# Patient Record
Sex: FEMALE | Race: WHITE
[De-identification: names, ages, dates, MRNs, and addresses within clinical notes are randomized per-mention and may not be internally consistent; named-entity substitution may affect disease eponyms.]

---

## 2020-07-21 ENCOUNTER — HOSPITAL ENCOUNTER (EMERGENCY)
Dept: HOSPITAL 26 - MED | Age: 40
Discharge: HOME | End: 2020-07-21
Payer: COMMERCIAL

## 2020-07-21 VITALS — SYSTOLIC BLOOD PRESSURE: 127 MMHG | DIASTOLIC BLOOD PRESSURE: 81 MMHG

## 2020-07-21 VITALS — WEIGHT: 205 LBS | HEIGHT: 67 IN | BODY MASS INDEX: 32.18 KG/M2

## 2020-07-21 DIAGNOSIS — Z79.899: ICD-10-CM

## 2020-07-21 DIAGNOSIS — O20.8: Primary | ICD-10-CM

## 2020-07-21 DIAGNOSIS — Z3A.09: ICD-10-CM

## 2020-07-21 LAB
ALBUMIN FLD-MCNC: 3.5 G/DL (ref 3.4–5)
ANION GAP SERPL CALCULATED.3IONS-SCNC: 16 MMOL/L (ref 8–16)
APPEARANCE UR: CLEAR
AST SERPL-CCNC: 49 U/L (ref 15–37)
BASOPHILS # BLD AUTO: 0.1 K/UL (ref 0–0.22)
BASOPHILS NFR BLD AUTO: 0.7 % (ref 0–2)
BILIRUB SERPL-MCNC: 0.4 MG/DL (ref 0–1)
BILIRUB UR QL STRIP: NEGATIVE
BUN SERPL-MCNC: 6 MG/DL (ref 7–18)
CHLORIDE SERPL-SCNC: 104 MMOL/L (ref 98–107)
CO2 SERPL-SCNC: 23.6 MMOL/L (ref 21–32)
COLOR UR: YELLOW
CREAT SERPL-MCNC: 0.8 MG/DL (ref 0.6–1.3)
EOSINOPHIL # BLD AUTO: 0.2 K/UL (ref 0–0.4)
EOSINOPHIL NFR BLD AUTO: 2.1 % (ref 0–4)
ERYTHROCYTE [DISTWIDTH] IN BLOOD BY AUTOMATED COUNT: 16.4 % (ref 11.6–13.7)
GFR SERPL CREATININE-BSD FRML MDRD: 101 ML/MIN (ref 90–?)
GLUCOSE SERPL-MCNC: 97 MG/DL (ref 74–106)
GLUCOSE UR STRIP-MCNC: NEGATIVE MG/DL
HCT VFR BLD AUTO: 37.9 % (ref 36–48)
HGB BLD-MCNC: 12.2 G/DL (ref 12–16)
HGB UR QL STRIP: (no result)
LEUKOCYTE ESTERASE UR QL STRIP: NEGATIVE
LYMPHOCYTES # BLD AUTO: 4.2 K/UL (ref 2.5–16.5)
LYMPHOCYTES NFR BLD AUTO: 37.5 % (ref 20.5–51.1)
MCH RBC QN AUTO: 24 PG (ref 27–31)
MCHC RBC AUTO-ENTMCNC: 32 G/DL (ref 33–37)
MCV RBC AUTO: 74.8 FL (ref 80–94)
MONOCYTES # BLD AUTO: 0.6 K/UL (ref 0.8–1)
MONOCYTES NFR BLD AUTO: 5.2 % (ref 1.7–9.3)
NEUTROPHILS # BLD AUTO: 6.1 K/UL (ref 1.8–7.7)
NEUTROPHILS NFR BLD AUTO: 54.5 % (ref 42.2–75.2)
NITRITE UR QL STRIP: NEGATIVE
PH UR STRIP: 6 [PH] (ref 5–9)
PLATELET # BLD AUTO: 237 K/UL (ref 140–450)
POTASSIUM SERPL-SCNC: 3.6 MMOL/L (ref 3.5–5.1)
RBC # BLD AUTO: 5.07 MIL/UL (ref 4.2–5.4)
RBC #/AREA URNS HPF: (no result) /HPF (ref 0–5)
SODIUM SERPL-SCNC: 140 MMOL/L (ref 136–145)
WBC # BLD AUTO: 11.2 K/UL (ref 4.8–10.8)
WBC,URINE: (no result) /HPF (ref 0–5)

## 2020-07-21 PROCEDURE — 85025 COMPLETE CBC W/AUTO DIFF WBC: CPT

## 2020-07-21 PROCEDURE — 80053 COMPREHEN METABOLIC PANEL: CPT

## 2020-07-21 PROCEDURE — 84702 CHORIONIC GONADOTROPIN TEST: CPT

## 2020-07-21 PROCEDURE — 99284 EMERGENCY DEPT VISIT MOD MDM: CPT

## 2020-07-21 PROCEDURE — 36415 COLL VENOUS BLD VENIPUNCTURE: CPT

## 2020-07-21 PROCEDURE — 76801 OB US < 14 WKS SINGLE FETUS: CPT

## 2020-07-21 PROCEDURE — 81001 URINALYSIS AUTO W/SCOPE: CPT

## 2020-07-21 NOTE — NUR
handed urine cup to pt for sample. admits to mild intermittent cramping type 
pain with spotting advance to vaginal bleeding

## 2020-07-21 NOTE — NUR
ULTRASOUND AND LABS PRINTED FOR PT TO F/U WITH HER OB/GYN

Patient discharged with v/s stable. Written and verbal after care instructions 
given and explained. 

Patient verbalized understanding. Ambulatory with steady gait. All questions 
addressed prior to discharge. Advised to follow up with PMD.

## 2020-07-24 ENCOUNTER — HOSPITAL ENCOUNTER (EMERGENCY)
Dept: HOSPITAL 26 - MED | Age: 40
Discharge: HOME | End: 2020-07-24
Payer: COMMERCIAL

## 2020-07-24 VITALS — BODY MASS INDEX: 41.47 KG/M2 | HEIGHT: 62 IN | WEIGHT: 225.37 LBS

## 2020-07-24 VITALS — SYSTOLIC BLOOD PRESSURE: 143 MMHG | DIASTOLIC BLOOD PRESSURE: 85 MMHG

## 2020-07-24 DIAGNOSIS — Z46.6: ICD-10-CM

## 2020-07-24 DIAGNOSIS — Z79.899: ICD-10-CM

## 2020-07-24 DIAGNOSIS — R33.9: Primary | ICD-10-CM

## 2020-07-24 NOTE — NUR
39 Y/O FEMALE PRESENTS TO ER WITH URINARY RETENTION. PT STATES SHE IS UNABLE TO 
URINATE, LAST URINATION WAS 3 DAYS AGO. PT IS 9 WEEKS PREGNANT AND STATES THIS 
HAS HAPPENED BEFORE, AND AN INDWELLING CATH WAS REQUIRED. PT REPORTS PAIN AT 
PUBIC REGION, 10/10 CRAMPING PAIN. DENIES ANY N/V/D, FEVER, SOB.

## 2020-07-24 NOTE — NUR
# 16 FR Lisa catheter with 10 ml utilizing sterile technique.  Immediate 
return of 4000 ml CLEAR urine noted. LEG BAG ATTACHED.  Pt tolerated procedure 
WELL.

## 2020-08-03 ENCOUNTER — HOSPITAL ENCOUNTER (EMERGENCY)
Dept: HOSPITAL 26 - MED | Age: 40
Discharge: HOME | End: 2020-08-03
Payer: COMMERCIAL

## 2020-08-03 VITALS — HEIGHT: 62 IN | WEIGHT: 221.5 LBS | BODY MASS INDEX: 40.76 KG/M2

## 2020-08-03 VITALS — DIASTOLIC BLOOD PRESSURE: 73 MMHG | SYSTOLIC BLOOD PRESSURE: 132 MMHG

## 2020-08-03 DIAGNOSIS — R82.71: ICD-10-CM

## 2020-08-03 DIAGNOSIS — O98.811: ICD-10-CM

## 2020-08-03 DIAGNOSIS — Z79.899: ICD-10-CM

## 2020-08-03 DIAGNOSIS — O20.8: Primary | ICD-10-CM

## 2020-08-03 DIAGNOSIS — Z3A.10: ICD-10-CM

## 2020-08-03 LAB
ANION GAP SERPL CALCULATED.3IONS-SCNC: 13.8 MMOL/L (ref 8–16)
APPEARANCE UR: CLEAR
BASOPHILS # BLD AUTO: 0.1 K/UL (ref 0–0.22)
BASOPHILS NFR BLD AUTO: 0.6 % (ref 0–2)
BILIRUB UR QL STRIP: NEGATIVE
BUN SERPL-MCNC: 5 MG/DL (ref 7–18)
CHLORIDE SERPL-SCNC: 102 MMOL/L (ref 98–107)
CO2 SERPL-SCNC: 25.4 MMOL/L (ref 21–32)
COLOR UR: YELLOW
CREAT SERPL-MCNC: 0.8 MG/DL (ref 0.6–1.3)
EOSINOPHIL # BLD AUTO: 0.3 K/UL (ref 0–0.4)
EOSINOPHIL NFR BLD AUTO: 2.1 % (ref 0–4)
ERYTHROCYTE [DISTWIDTH] IN BLOOD BY AUTOMATED COUNT: 16.4 % (ref 11.6–13.7)
GFR SERPL CREATININE-BSD FRML MDRD: 101 ML/MIN (ref 90–?)
GLUCOSE SERPL-MCNC: 94 MG/DL (ref 74–106)
GLUCOSE UR STRIP-MCNC: NEGATIVE MG/DL
HCT VFR BLD AUTO: 39 % (ref 36–48)
HGB BLD-MCNC: 12.4 G/DL (ref 12–16)
HGB UR QL STRIP: NEGATIVE
LEUKOCYTE ESTERASE UR QL STRIP: (no result)
LYMPHOCYTES # BLD AUTO: 4.1 K/UL (ref 2.5–16.5)
LYMPHOCYTES NFR BLD AUTO: 32.4 % (ref 20.5–51.1)
MCH RBC QN AUTO: 24 PG (ref 27–31)
MCHC RBC AUTO-ENTMCNC: 32 G/DL (ref 33–37)
MCV RBC AUTO: 75.7 FL (ref 80–94)
MONOCYTES # BLD AUTO: 0.7 K/UL (ref 0.8–1)
MONOCYTES NFR BLD AUTO: 5.6 % (ref 1.7–9.3)
NEUTROPHILS # BLD AUTO: 7.4 K/UL (ref 1.8–7.7)
NEUTROPHILS NFR BLD AUTO: 59.3 % (ref 42.2–75.2)
NITRITE UR QL STRIP: POSITIVE
PH UR STRIP: 5.5 [PH] (ref 5–9)
PLATELET # BLD AUTO: 224 K/UL (ref 140–450)
POTASSIUM SERPL-SCNC: 3.2 MMOL/L (ref 3.5–5.1)
RBC # BLD AUTO: 5.16 MIL/UL (ref 4.2–5.4)
RBC #/AREA URNS HPF: (no result) /HPF (ref 0–5)
SODIUM SERPL-SCNC: 138 MMOL/L (ref 136–145)
WBC # BLD AUTO: 12.5 K/UL (ref 4.8–10.8)

## 2020-08-03 PROCEDURE — 87299 ANTIBODY DETECTION NOS IF: CPT

## 2020-08-03 PROCEDURE — 81025 URINE PREGNANCY TEST: CPT

## 2020-08-03 PROCEDURE — 81001 URINALYSIS AUTO W/SCOPE: CPT

## 2020-08-03 PROCEDURE — 80048 BASIC METABOLIC PNL TOTAL CA: CPT

## 2020-08-03 PROCEDURE — 85025 COMPLETE CBC W/AUTO DIFF WBC: CPT

## 2020-08-03 PROCEDURE — 86901 BLOOD TYPING SEROLOGIC RH(D): CPT

## 2020-08-03 PROCEDURE — 99284 EMERGENCY DEPT VISIT MOD MDM: CPT

## 2020-08-03 PROCEDURE — 87086 URINE CULTURE/COLONY COUNT: CPT

## 2020-08-03 PROCEDURE — 36415 COLL VENOUS BLD VENIPUNCTURE: CPT

## 2020-08-03 PROCEDURE — 87110 CHLAMYDIA CULTURE: CPT

## 2020-08-03 PROCEDURE — 84702 CHORIONIC GONADOTROPIN TEST: CPT

## 2020-08-03 PROCEDURE — 86900 BLOOD TYPING SEROLOGIC ABO: CPT

## 2020-08-03 PROCEDURE — 76817 TRANSVAGINAL US OBSTETRIC: CPT

## 2020-08-09 ENCOUNTER — HOSPITAL ENCOUNTER (INPATIENT)
Dept: HOSPITAL 26 - MED | Age: 40
LOS: 2 days | Discharge: HOME HEALTH | DRG: 566 | End: 2020-08-11
Attending: OBSTETRICS & GYNECOLOGY | Admitting: OBSTETRICS & GYNECOLOGY
Payer: COMMERCIAL

## 2020-08-09 VITALS — DIASTOLIC BLOOD PRESSURE: 53 MMHG | SYSTOLIC BLOOD PRESSURE: 99 MMHG

## 2020-08-09 VITALS — DIASTOLIC BLOOD PRESSURE: 61 MMHG | SYSTOLIC BLOOD PRESSURE: 112 MMHG

## 2020-08-09 VITALS — BODY MASS INDEX: 40.67 KG/M2 | HEIGHT: 62 IN | WEIGHT: 221 LBS

## 2020-08-09 VITALS — DIASTOLIC BLOOD PRESSURE: 90 MMHG | SYSTOLIC BLOOD PRESSURE: 141 MMHG

## 2020-08-09 DIAGNOSIS — R33.9: ICD-10-CM

## 2020-08-09 DIAGNOSIS — O23.41: Primary | ICD-10-CM

## 2020-08-09 DIAGNOSIS — O99.281: ICD-10-CM

## 2020-08-09 DIAGNOSIS — E87.6: ICD-10-CM

## 2020-08-09 DIAGNOSIS — B96.1: ICD-10-CM

## 2020-08-09 DIAGNOSIS — B96.5: ICD-10-CM

## 2020-08-09 DIAGNOSIS — Z3A.11: ICD-10-CM

## 2020-08-09 LAB
ALBUMIN FLD-MCNC: 3.7 G/DL (ref 3.4–5)
ANION GAP SERPL CALCULATED.3IONS-SCNC: 16.3 MMOL/L (ref 8–16)
APPEARANCE UR: CLEAR
AST SERPL-CCNC: 25 U/L (ref 15–37)
BASOPHILS # BLD AUTO: 0.1 K/UL (ref 0–0.22)
BASOPHILS NFR BLD AUTO: 0.5 % (ref 0–2)
BILIRUB SERPL-MCNC: 0.6 MG/DL (ref 0–1)
BILIRUB UR QL STRIP: NEGATIVE
BUN SERPL-MCNC: 6 MG/DL (ref 7–18)
CHLORIDE SERPL-SCNC: 103 MMOL/L (ref 98–107)
CO2 SERPL-SCNC: 24.1 MMOL/L (ref 21–32)
COLOR UR: (no result)
CREAT SERPL-MCNC: 0.8 MG/DL (ref 0.6–1.3)
EOSINOPHIL # BLD AUTO: 0.2 K/UL (ref 0–0.4)
EOSINOPHIL NFR BLD AUTO: 1.9 % (ref 0–4)
ERYTHROCYTE [DISTWIDTH] IN BLOOD BY AUTOMATED COUNT: 16.7 % (ref 11.6–13.7)
GFR SERPL CREATININE-BSD FRML MDRD: 101 ML/MIN (ref 90–?)
GLUCOSE SERPL-MCNC: 105 MG/DL (ref 74–106)
GLUCOSE UR STRIP-MCNC: NEGATIVE MG/DL
HCT VFR BLD AUTO: 38.2 % (ref 36–48)
HGB BLD-MCNC: 12.2 G/DL (ref 12–16)
HGB UR QL STRIP: NEGATIVE
LEUKOCYTE ESTERASE UR QL STRIP: (no result)
LYMPHOCYTES # BLD AUTO: 4.2 K/UL (ref 2.5–16.5)
LYMPHOCYTES NFR BLD AUTO: 34.3 % (ref 20.5–51.1)
MCH RBC QN AUTO: 24 PG (ref 27–31)
MCHC RBC AUTO-ENTMCNC: 32 G/DL (ref 33–37)
MCV RBC AUTO: 76.3 FL (ref 80–94)
MONOCYTES # BLD AUTO: 0.6 K/UL (ref 0.8–1)
MONOCYTES NFR BLD AUTO: 4.8 % (ref 1.7–9.3)
NEUTROPHILS # BLD AUTO: 7.2 K/UL (ref 1.8–7.7)
NEUTROPHILS NFR BLD AUTO: 58.5 % (ref 42.2–75.2)
NITRITE UR QL STRIP: NEGATIVE
PH UR STRIP: 6 [PH] (ref 5–9)
PLATELET # BLD AUTO: 216 K/UL (ref 140–450)
POTASSIUM SERPL-SCNC: 3.4 MMOL/L (ref 3.5–5.1)
RBC # BLD AUTO: 5.01 MIL/UL (ref 4.2–5.4)
RBC #/AREA URNS HPF: (no result) /HPF (ref 0–5)
SODIUM SERPL-SCNC: 140 MMOL/L (ref 136–145)
WBC # BLD AUTO: 12.2 K/UL (ref 4.8–10.8)
WBC,URINE: (no result) /HPF (ref 0–5)

## 2020-08-09 PROCEDURE — C1758 CATHETER, URETERAL: HCPCS

## 2020-08-09 PROCEDURE — C1751 CATH, INF, PER/CENT/MIDLINE: HCPCS

## 2020-08-09 NOTE — NUR
Patient will be admitted to care of Dr sinha. Admited to M/S.  Will go to 
room 104B. Belongings list completed.  Report to treva Vance.

## 2020-08-09 NOTE — NUR
RECEIVED  REPORT FROM DAY RN REGARDING THE PT FOR CONTINUITY OF CARE. PATIENT SITTING 
UPRIGHT ON THE BED WATCHING TV. PT DENIES ANY CHEST PAIN, SOB, NUMBNESS AND TINGLING IN ALL 
EXTREMITIES. NOTED PATIENT HAS LEFT FACIAL DROOP. SPEECH IS CLEAR. ABLE TO MOVE ALL 
EXTREMITIES AND ABLE TO AMBULATE WITH STANDBY ASSIST ONLY. NO COMPLAIN AT THIS TIME. NO SIGN 
AND SYMPTOMS OF DISTRESS NOTED. FALL PRECAUTION IMPLEMENTED. INSTRUCTED TO  CALL FOR 
ASSISTANCE AT ALL TIMES. CALL LIGHT WITHIN REACH. WILL CONTINUE POC.

## 2020-08-09 NOTE — NUR
ROUNDED ON PATIENT, REFILLED WATER. PER PATIENT REQUEST, CHANGED RIVAS CATHETER LEG BAG TO 
LARGER DRAINAGE BAG. GAVE INSTRUCTIONS TO ALWAYS LEAVE BELOW LEVEL OF BLADDER, TO NOT LET 
THERE BE ANY KINKS AND THAT THE URINE MUST ALWAYS BE DRAINING AND NOT STUCK IN TUBING. 
PATIENT VERBALIZED UNDERSTANDING. WILL CONTINUE TO MONITOR. ALL NEEDS MET, CALL LIGHT WITHIN 
REACH.

## 2020-08-09 NOTE — NUR
SPOKE WITH LAB, THEY STATE THAT THE PATIENTS URINE CULTURE HAD ALREADY BEEN SUBMITTED AND IS 
BEING SENT TO [facility] AT THE MOMENT. NOTED AND WILL CONTINUE TO MONITOR 
PATIENT.

## 2020-08-09 NOTE — NUR
UPON PATIENT ROUNDS, PATIENT SITTING UP IN BED, ALERT ORIENTED, IN NO S/S RESPIRATORY 
DISTRESS, NO COMPLAINTS OF PAIN. VITAL SIGNS STABLE. EATING LUNCH. ALL NEEDS MET, CALL LIGHT 
WITHIN REACH, WILL CONTINUE TO MONITOR.

## 2020-08-09 NOTE — NUR
ROUNDED ON PATIENT, PT IS STABILIZED, HAS NO NEEDS AT THIS TIME. PT IN NO S/S RESPIRATORY 
DISTRESS, DENIES ANY PAIN. ALL NEEDS MET, CALL LIGHT WITHIN REACH, WILL CONTINUE TO MONITOR.

## 2020-08-09 NOTE — NUR
RECEIVED THE REPORT FROM THE DAY RN REGARDING THE PT FOR CONTINUITY OF CARE. PATIENT WAS 
SITTING ON THE CHAIR AND STATED THAT HER RIVAS CATHETER FEELS LIKE ITS SLIPPING OUT. THE DAY 
RN TO INFLATED THE RIVAS BALLOON WITH 5 CC NS. INSTRUCTED THE PT TO CALL RN IF SHE FEELS 
THAT HER RIVAS CATHETER STILL FEELS LIKE SLIPPING OUT. OTHERWISE NO OTHER COMPLAIN AT THIS 
TIME. DENIES ANY PAIN. NO SIGN AND SYMPTOMS OF DISTRESS NOTED. CALL LIGHT WITHIN REACH. WILL 
CONTINUE POC.

-------------------------------------------------------------------------------

Addendum: 08/09/20 at 2316 by Raquel Khan RN RN

-------------------------------------------------------------------------------

WRONG PATIENT ENTRY

## 2020-08-09 NOTE — NUR
RECEIVED BEDSIDE REPORT FROM ER NURSE REGARDING PT CONDITION AND PLAN OF CARE. PT IS 
CURRENTLY STABILIZED, IN NO S/S RESPIRATORY DISTRESS, NO COMPLAINTS OF PAIN. ALERT AND 
ORIENTED X 4, ENGLISH SPEAKING, AMBULATORY. RESPIRATIONS EVEN AND UNLABORED. SKIN INTACT, NO 
CYANOSIS PALLOR OR EDEMA NOTED, CAP REFILL < 3 SECONDS. S1S2 HEART SOUNDS AUSCULTATED. 
ABDOMEN IS ROUNDED AS PATIENT IS AOG 11 WEEKS. PATIENT HAS RIVAS CATHETER ATTACHED TO LEG 
BAG , SHE DRAINS HER LEG BAG HERSELF IN THE RESTROOM. VITAL SIGNS STABLE. ALL NEEDS MET, 
CALL LIGHT WITHIN REACH , WILL CONTINUE TO MONITOR.

## 2020-08-09 NOTE — NUR
GAVE BEDSIDE REPORT REGARDING PT CONDITION AND PLAN OF CARE. PT IS STABILIZED RESTING 
COMFORTABLY IN BED, IN NO S/S RESPIRATORY DISTRESS, DENIES PAIN. ENDORSED TO NIGHT SHIFT 
NURSE REGARDING RIVAS CATHETER . VERBALIZED UNDERSTANDING

## 2020-08-09 NOTE — NUR
DISCONTINUED RIVAS CATHETER AS ORDERED. INSTRUCTED THE PATIENT TO CALL IF SHE HAS THE URGE 
TO URINATE AND IF SHE FEELS LIKE HER BLADDER IS FULL SO WE CAN DO THE STRAIGHT CATHETER. 
PATIENT WAS NOTIFIED EARLIER THAT MD ORDERED TO DO INTERMITTENT STRAIGHT CATHETERIZATION Q6 
HOURS AND TO TEACH THE PATIENT HOW TO DO SELF CATHETERIZATION. PATIENT VERBALIZED THAT SHE 
WILL TRY TO URINATE ON HER OWN FIRST AND SHE WILL CALL IF SHE CAN'T VOID ON HER OWN TO DO 
THE STRAIGHT CATHETERIZATION. CALL LIGHT WITHIN REACH.

## 2020-08-09 NOTE — NUR
H/L EST G18 ON PT LT AC. BLD DRAWN INCLUDING 2 BLD CULTURE , LACTIC, PINK TOP 
AND ON PROCESS TO SENT TO THE LAB.

## 2020-08-09 NOTE — NUR
DR ERAZO IN GAVE VERBAL ORDERS FOR ULTRASOUND OF ABDOMEN AND ALSO CONSULTS FOR DR BOLAÑOS 
AND DR FIONA ODOM. HE STATES HE WILL PERSONALLY TALK TO THE DOCTORS. ORDERS NOTED AND 
CARRIED OUT.

-------------------------------------------------------------------------------

Addendum: 08/09/20 at 1600 by Pinky Borja RN

-------------------------------------------------------------------------------

CORRECTION: US PREGNANCY < 14 WEEKS

## 2020-08-09 NOTE — NUR
PT WAS SEEN A FEW DAYS AGO FOR URINARY RETENTION HAD A RIVAS CATH PLACED. SHE 
HAS THIS PROBLEM WITH EVERY PREG IN THE EARLY MONTHS.  SHE CURRENTLY HAS TWO 
DIFFERENT BACTERIAL STRAINS OF UTI'S THAT REQUIRE HER TO RECIEVE IV 
ANITBIOTICS.  SHE RETURNED TONIGHT TO BE ADMITTED TO START RECIEVING THE IV 
THERAPY.  PT DENIES ANY VAG BLEEDING, OR FOUL ODOR. PT AFEBRILE, NO N/V/D.  PT 
ON BEDSIDE MONITOR.  BED IN LOWEST POSITION AND SIDE RAIL UP X 1.



NKA

## 2020-08-10 VITALS — DIASTOLIC BLOOD PRESSURE: 70 MMHG | SYSTOLIC BLOOD PRESSURE: 131 MMHG

## 2020-08-10 VITALS — SYSTOLIC BLOOD PRESSURE: 98 MMHG | DIASTOLIC BLOOD PRESSURE: 51 MMHG

## 2020-08-10 VITALS — DIASTOLIC BLOOD PRESSURE: 72 MMHG | SYSTOLIC BLOOD PRESSURE: 111 MMHG

## 2020-08-10 NOTE — NUR
CALLED DR. BOLAÑOS, UROLOGIST- INFORMED HIM THAT PATIENT HAS A TOTAL OF 3000 ML AS PER 
MEASUREMENT W/ THE PATIENT AS MY WITNESS AS TO THE LEVEL OF URINE DRAINED.  PER DOCTOR THAT 
IS NOT POSSIBLE THAT I GOT 3000 ML. HE SAID THAT WE SHOULD RECORD THE OUTPUT EVERY TIME WITH 
DRAIN, AND NO RIVAS CATHETER INSERTION FOR NOW. CARRIED OUT 'S ORDERS.

## 2020-08-10 NOTE — NUR
PATIENT CALLED IN THE ROOM AND STATED THAT SHE URINATED TWICE AFTER THE STRAIGHT 
CATHETERIZATION 1ST ONE IS 1000 CC ,THE 2ND VOID IS 800CC AND 3RD VOID IS 500CC. PT IS 
HOPEFUL THAT SHE WILL CONTINUE TO VOID ON HER OWN TO AVOID CATHETERIZATION.

## 2020-08-10 NOTE — NUR
RECEIVED BEDSIDE REPORT FROM NIGHT SHIFT NURSE REGARDING PATIENT CONDITION AND PLAN OF CARE. 
PATIENT IS IN STABLE CONDITION. AOX4, IN NO S/S RESPIRATORY DISTRESS, HAS JUST VOIDED IN THE 
RESTROOM. PATIENT SKIN INTACT, NO CYANOSIS, PALLOR, OR EDEMA NOTED. ALL NEEDS MET, CALL 
LIGHT WITHIN REACH, WILL CONTINUE WITH PLAN OF CARE

## 2020-08-10 NOTE — NUR
PATIENT STABLE. NO ACUTE EVENTS OVERNIGHT. NO SIGN AND SYMPTOMS OF DISTRESS NOTED. NO 
COMPLAIN AT THIS TIME. ALL NEEDS ATTENDED. CALL LIGHT WITHIN REACH. WILL ENDORSE THE PT TO 
THE ONCOMING RN FOR CONTINUITY OF CARE.

## 2020-08-10 NOTE — NUR
RECEIVED BEDSIDE REPORT FROM AM SHIFT NURSE REGARDING PATIENT CONDITION AND PLAN OF CARE. 
AOX4, PREVIOUS SHIFT DRAINING THROUGH INTERMITTENT CATHETER RIGHT NOW. NO S/S RESPIRATORY 
DISTRESS, PATIENT SKIN INTACT, NO CYANOSIS, PALLOR, OR EDEMA NOTED. ALL NEEDS MET, CALL 
LIGHT WITHIN REACH, WILL CONTINUE WITH PLAN OF CARE

## 2020-08-10 NOTE — NUR
PT VERBALIZED THAT SHE URINATED TWICE AFTER THE RIVAS WAS DISCONTINUED. INSTRUCTED THE 
PATIENT  TO SAVE THE URINE EVERY TIME SHE URINATE. PLACED A WHITE HAT IN THE TOILET. PT 
VERBALIZED UNDERSTANDING.

## 2020-08-10 NOTE — NUR
FORTAZ GIVEN, NO ADVERSE REACTION NOTED. PT IS STABLE. ALL NEEDS MET, CALL LIGHT WITHIN 
REACH, WILL CONTINUE TO MONITOR.

## 2020-08-10 NOTE — NUR
TALKED TO DR. ERAZO, TOLD HIM THAT PT HAS VAGINAL BLEEDING RIGHT NOW, MODERATE DARK RED 
BLEEDING. I INFORMED HIM THAT PT IS HAVING MAXIMAL OUTPUT W/ THE INTERMITTENT CATHERIZATION, 
AT THIS TIME 1500 ML TOTAL

## 2020-08-10 NOTE — NUR
ROUNDED ON PATIENT, PATIENT REALLY BELIEVES THAT SHE WILL BE UNABLE TO DO HER OWN STRAIGHT 
CATHETERIZATION AFTER WATCHING SEVERAL NURSES YESTERDAY AND TODAY HAVE DIFFICULTY WITH 
PUTTING IN CATHETER. TRIED TO ENCOURAGE PATIENT BUT PATIENT IS SURE. WILL ENDORSE TO DR. BOLAÑOS WHEN HE IS IN UNIT. WATER REFILLED. ALL NEEDS MET, CALL LIGHT WITHIN REACH, WILL 
CONTINUE TO MONITOR.

## 2020-08-10 NOTE — NUR
PATIENT STABLE. ENDORSED THE PT TO THE DAY RN FOR CONTINUITY OF CARE.BED IN LOW POSITION. 
SIGNING OFF.

## 2020-08-10 NOTE — NUR
PT COMPLAINS OF "FULLNESS OF BLADDER" AND HAS NOT BEEN ABLE TO URINATE . ATTEMPTED TO 
STRAIGHT CATHETERIZE PATIENT. STERILE PROCEDURE FOLLOWED. 3 CATHETERIZATION ATTEMPTS WITH 3 
DIFFERENT 14 FR CATHETERS, STILL UNSUCCESSFUL. NO URINE OUTPUT. WILL ADVOCATE TO  DR. BOLAÑOS 
REGARDING PT DIFFICULT CATHETERIZATION. AFTER THE ATTEMPTS, PATIENT ACTUALLY URINATED IN 
RESTROOM OUTPUT ABOUT 500 ML. ALL NEEDS MET, CALL LIGHT WITHIN REACH, WILL CONTINUE TO 
MONITOR.

## 2020-08-10 NOTE — NUR
PATIENT REQUESTED STRAIGHT CATHETERIZATION, 2L OUTPUT. PATIENT TOLERATED WELL. STERILE 
PROCEDURE FOLLOWED. BEDSIDE REPORT GIVEN TO NIGHT SHIFT NURSE MADE AWARE REGARDING PATIENT 
PLAN OF CARE. ALL NEEDS MET, CALL LIGHT WITHIN REACH.

## 2020-08-10 NOTE — NUR
OB ULTRASOUND FOR VAGINAL BLEEDINGAT BEDSIDE WITH DR. ERAZO ALSO AT BEDSIDE, WATCHING THE 
OB ULTRASOUND DONE. HE SAID TO ORDER RIVAS CATHETER INSERTION

## 2020-08-10 NOTE — NUR
PATIENT CALLED IN THE ROOM AND STATED THAT SHE FEELS THAT HER BLADDER IS FULL AND AGREED TO 
DO THE STRAIGHT CATHETERIZATION. PT MADE AWARE THAT I HAVE TO EDUCATE HER ON HOW TO DO IT ON 
HER OWN ONCE SHE GOES HOME. VERBAL EDUCATION GIVEN 1ST AND  SHOWED STEP BY STEP WHAT WE ARE 
GOING TO DO. PATIENT VERBALIZED THAT ITS GOING TO BE HARD FOR HER TO DO IT ON HER OWN AND 
STATED THAT HER  ALSO IS NOT AVAILABLE TO DO IT BECAUSE HE GOES TO WORK @ 5AM AND 
COME HOME AROUND7 PM. PT IS ALSO CONCERNED ABOUT DOING THE CATHETERIZATION Q6 HOURS BECAUSE 
SHE STATED THAT SHE HAS TO VOID ALMOST EVERY 30 MINUTES TO AN HOUR AND SHE DOESN'T THINK SHE 
CAN WAIT FOR 6 HOURS TO DO THE CATHETERIZATION. PT IS ALSO WORRIED THAT SHE IS MORE PRONE TO 
INFECTION DOING THE CATHETERIZATION EVERY HOUR AND RATHER HAVE A RIVAS CATHETER PUT IN AND 
HAVE IT CHANGE EVERY 2 WEEKS IN HER DOCTORS OFFICE. WILL RELAY THE MESSAGE TO DR BOLAÑOS AND 
THE PRIMARY TEAM.

## 2020-08-10 NOTE — NUR
ROUNDED ON PATIENT, PT HAS BEEN URINATED FREQUENTLY AND LARGE AMOUNTS. STATES SHE HAS 
EMPTIED ABOUT 4L SO FAR. WILL CONTINUE TO MONITOR. ALL NEEDS MET, CALL LIGHT WITHIN REACH, 
WILL CONTINUE TO MONITOR.

## 2020-08-10 NOTE — NUR
PATIENT HAS BEEN SCREENED AND CATEGORIZED AS LOW NUTRITION RISK. PATIENT WILL BE SEEN WITHIN 
7 DAYS OF ADMISSION.



08/15/20



[address]

## 2020-08-11 VITALS — DIASTOLIC BLOOD PRESSURE: 70 MMHG | SYSTOLIC BLOOD PRESSURE: 121 MMHG

## 2020-08-11 VITALS — SYSTOLIC BLOOD PRESSURE: 115 MMHG | DIASTOLIC BLOOD PRESSURE: 62 MMHG

## 2020-08-11 VITALS — SYSTOLIC BLOOD PRESSURE: 113 MMHG | DIASTOLIC BLOOD PRESSURE: 61 MMHG

## 2020-08-11 VITALS — DIASTOLIC BLOOD PRESSURE: 80 MMHG | SYSTOLIC BLOOD PRESSURE: 127 MMHG

## 2020-08-11 VITALS — DIASTOLIC BLOOD PRESSURE: 80 MMHG | SYSTOLIC BLOOD PRESSURE: 120 MMHG

## 2020-08-11 PROCEDURE — B548ZZA ULTRASONOGRAPHY OF SUPERIOR VENA CAVA, GUIDANCE: ICD-10-PCS | Performed by: OBSTETRICS & GYNECOLOGY

## 2020-08-11 PROCEDURE — 02HV33Z INSERTION OF INFUSION DEVICE INTO SUPERIOR VENA CAVA, PERCUTANEOUS APPROACH: ICD-10-PCS | Performed by: OBSTETRICS & GYNECOLOGY

## 2020-08-11 NOTE — NUR
NOTE:



Patient's Orientation 

Unable To Assess

Information Provided By 

JANET TILLEY - SIGNIFICANT OTHER

Comments 

SW SPOKE WITH JOSEJAECORRY TILLEY [phone] USING  

ANAHI [id number].

, Realtionship and Phone Number 

JANET TILLEY

SIGNIFICANT OTHER

[phone]

Healthcare Power of  

No

Does Patient Have a POLST 

No

Identifying Problems 

No Social Work Triggers

Is A Social Work Consult Needed 

No

Mandate Report Filed 

No

Explanation Of Identifying Problems 

PATIENT IS A 40-YEAR-OLD FEMALE ADMITTED FOR UTI AND 1ST TRIMESTER 

PREGNANCY. PATIENT HAS NO PERTINENT PMHX.

Admitted From 

Home

Pre-Admission Level Of Functioning Status 

Independent/Ambulatory

Prior Resources/Services Used In Last 12 Months 

No Prior Resources Used

Prior DME 

No Prior DME Used

Living Situation 

Lives With Family

House

Patient Had Caregiver 

No

Home Support 

No Caregiver Issues

Financial Issues 

No Known Financial Issue

Referral To The Financial Counselor Needed 

No

Factors/Needs 

No D/C Needs Identified

Pt/Rep Participated In Discharge Plan 

Yes

Patient/Family Agress With Discharge Plan 

Yes

Discharge Plan Comments 

TENTATIVE DISCHARGE PLAN IS FOR PATIENT TO RETURN HOME.

DC Plan Status 

Initiated

## 2020-08-11 NOTE — NUR
INFORMED PATIENT THAT PREFERRED PHARMACY WILL BE HANGING HER IV ANTIBIOTIC AT HOME AND [facility] 
CARE WILL BE [facility]. ALSO EXPLAINED THAT WE WILL GIVE THE NEXT DOSE OF 
ANTIBIOTIC HERE AROUND 1900, THEN SHE CAN BE DISCHARGE. PATIENT MADE AWARE. PATIENT 
VERBALIZED UNDERSTANDING

## 2020-08-11 NOTE — NUR
RECEIVED ORDER FOR HOME HEALTH. FAXED PATIENTS CLINICALS TO [facility] AND [facility]. WILL FAX TO 
[facility] ONCE WE RECEIVE ORDER FOR WHAT ABX PATIENT NEEDS.

-------------------------------------------------------------------------------

Addendum: 08/11/20 at 1153 by Melissa Beck 

-------------------------------------------------------------------------------

DC PLAN DISCUSSED WITH THE PATIENT HERSELF AND IS IN AGREEMENT.  SHE STATED THAT HER SON'S 
GIRLFRIEND IS TEACHABLE TO ADMINISTER IV ANTIBIOTICS.  SHE STATED SHE DOES NOT HAVE ANY 
PREFERENCE FOR HOME HEALTH, WHOEVER WE WILL FIND.  

-------------------------------------------------------------------------------

Addendum: 08/11/20 at 1213 by Melissa Beck 

-------------------------------------------------------------------------------

RECEIVED A CALL FROM DR. [facility] PHYSICIAN FOR DR. ERAZO STATING THAT THEY WANT 
TO CONTINUE CEFTAZIDIME 1000 MG Q8H X 8 MORE DAYS.  CLARIFIED ORDER IF SHE NEEDS US TO WAIT 
FOR ID RECOMMENDATIONS OR TO GO AHEAD WITH CEFTAZIDIME.  SHE STATED SHE ALREADY GOT THE 
RECOMMENDATIONS OVER THE PHONE.  SHE STATED PATIENT IS SENSITIVE WITH CEFTAZIDIME AND 
MEROPENEM, HOWEVER THEY ARE SAVING MEROPENEM FOR MORE STRONGER INFECTION AND PATIENT CAN BE 
STARTED WITH CEFTAZIDIME AND WHATEVER INSURANCE COVERS.  INFORMED HER THAT I SPOKE TO THE 
PATIENT AND A FAMILY MEMBER IS WILLING TO BE TAIGHT TO ADMINISTER THE ANTIBIOTIC IN BETWEEN.



DISCHARGE PLANNING:



THIS IS A 39 YO PREGNANT FEMALE PATIENT FROM HOME, WHO CAME IN DUE TO URINARY RETENTION.  
INITIAL DIAGNOSIS OF UTI/1ST TRIMESTER PREGNANCY.  CURRENT LABS INCLUDE WBC 12.2, H/H 
12.2/38.2, NA/K 140/3.4, BUN/CREA 6/0.8.  ON CEFTAZIDIME.  ID CONSULT IN PLACE.  DC PLAN 
BACK TO HOME ONCE STABLE.

-------------------------------------------------------------------------------

Addendum: 08/11/20 at 1226 by Cecily Mitchell CM

-------------------------------------------------------------------------------

FOLLOWED UP WITH [facility] AND SPOKE WITH [facility], UNFORTUNATELY THEY DO NOT ACCEPT PATIENTS 
INSURANCE. FAXED CLINICALS TO [facility] [phone].

-------------------------------------------------------------------------------

Addendum: 08/11/20 at 1328 by Melissa Beck 

-------------------------------------------------------------------------------

A CALL TRANSFERRED FROM [facility] FROM [facility] CARE SAL, SHE IS INQUIRING IF IT IS OK WITH THE 
DOCTOR TO MISS 2 DOSES OF ANTIBIOTICS SINCE THEIR NURSE WILL NOT BE AVAILABLE UNTIL TOMORROW 
AFTERNOON.  INFORMED HER THAT I WILL REACH OUT TO THE [facility] AND WILL CALL HER BACK.  CONTACTED 
[facility] XAVIER [phone] OPT 3.  PER [facility] TO GO AHEAD AND SEND REFERRAL TO 
[phone].  REFERRAL SENT.  WILL FOLLOW UP.

-------------------------------------------------------------------------------

Addendum: 08/11/20 at 1422 by Cecily Mitchell CM

-------------------------------------------------------------------------------

CONTACTED [facility] [phone] AND SPOKE WITH VISHAL THEY HAVE RECEIVED CLINICALS 
FOR PATIENTS. VISHAL STATED THAT THEY ALSO SENT A REFERRAL TO [facility]. THE 
HOME HEALTH WILL SEND A NURSE OUT ON DAY ONE TO TEACH THE PATIENT/FAMILY HOW TO ADMINISTER 
THE ABX AND THEN FOLLOW UP ONE MORE TIME TO CLEAN AND CHANGE THE DRESSINGS. VISHAL WILL 
FOLLOW UP 

-------------------------------------------------------------------------------

Addendum: 08/11/20 at 1640 by Melissa Beck 

-------------------------------------------------------------------------------

[facility] FOR RIVAS CARE AND [facility] FOR IV INFUSION.

-------------------------------------------------------------------------------

Addendum: 08/11/20 at 1659 by Melissa Beck 

-------------------------------------------------------------------------------

LATE ENTRY FOR 1420:



MADE A PHONE CALL TO DR. GRZEGORZ GARCIA PHYSICIAN FOR DR. ERAZO TO INFORM HER THAT 
INFUSION IS NOT ABLE TO STAFF PATIENT UNTIL TOMORROW AFTERNOON AND WILL BE MISSING 2 DOSES 
OF ANTIBIOTIC.  I ALSO INFORMED HER THAT WE SENT IT TO ANOTHER INFUSION COMPANY AND JUST 
WAITING FOR THEIR RESPONSE.  I ALSO INFORMED HER THAT I SPOKE [facility] OF PHARMACY ASKING 
IF WE CAN GIVE THE 2100 DOSE EARLIER.  PER [facility] PHARMACIST OK TO GIVE AT LEAST 6 HOURS 
FROM THE LAST DOSE.  PER DR. GRZEGORZ GARCIA PHYSICIAN FOR DR. ERAZO, "IT IS OK".  
PRIMARY RN MADE AWARE.



YASMIN OF [facility] MADE AWARE.  HAVEN HOFFMAN WILL SEND NURSE AT [phone] 
TOMORROW.  [facility], WILL SEND NURSE TOMORROW AT 0900.  [facility] 
WILL DELIVER MEDS TODAY PER [facility].



PATIENT AND PRIMARY RN MADE AWARE.

-------------------------------------------------------------------------------

Addendum: 08/11/20 at 1708 by Cecily Mitchell CM

-------------------------------------------------------------------------------

IV ABX AND HOME HEALTH WITH [facility] IS SET UP CONFIRMED WITH DIANELYS BUSTOS [facility]

## 2020-08-11 NOTE — NUR
ORDERED PICC LINNE INSERTION AND HOME HEALTH -FOR IV ADMINISTRATION AND RIVAS 
CATHETER CARE AT HOME

## 2020-08-11 NOTE — NUR
HUNG CEFTAZIDIME IVPB @ 100MLS/HR. PT SITTING IN BED EATING LUNCH. NO ACUTE DISTRESS NOTED. 
WILL CONTINUE TO MONITOR.

## 2020-08-11 NOTE — NUR
CONFIRMED WITH PHARMACIST THAT IT'S OKAY TO GIVE FORTAZ AT 1900 EVEN THOUGH IT IS SCHEDULED 
AT 2100. AFTER THIS DOSE, PATIENT CAN GO HOME FOR DISCHARGE.

## 2020-08-11 NOTE — NUR
DR. ERAZO CAME TO HOSPITAL, MADE ROUNDS TO PATIENTS, HE PLACED A RIVAS CATHETER ( WITH 
HIS ORDERED TO PLACE ONE). PT HAS VAGINAL BLEEDING AND HE ORDERED A RIVAS CATHETER TO BE 
INSERTED.

## 2020-08-11 NOTE — NUR
AA, O X 4, PT W/ RIVAS CATHETER , DRAINING MAX OUTPUT TOTAL OF 6. 5 ML. RECORDED IN A PIECE 
OF PAPER THE URINE OUTPUT FOR THE WHOLE SHIFT.

## 2020-08-11 NOTE — NUR
DISCONTINUED IV SITE ON THE RIGHT AC, LEFT THE PICCLINE ON THE LEFT UPPER ARM FOR IV 
ANTIBIOTICS FOR HOME HEALTH USE, PT'S RIVAS CATHETER LEFT ALSO FOR HOME HEALTH USE AND CARE.

## 2020-08-11 NOTE — NUR
RECEIVED REPORT FROM NIGHT RN. PT A/OX4. SINUS RHYTHM. LUNGS CTA BILAT. ON ROOM AIR. RIVAS 
CATHETER INSERTED 8/11 DURING NIGHT SHIFT, CLEAN DRY AND INTACT, FREE OF DEPENDENT LOOPS. 
20G IV IN L AC, CLEAN DRY AND INTACT. NO ACUTE DISTRESS NOTED. SAFETY PRECAUTIONS IN PLACE. 
BED IN LOW POSITION, CALL LIGHT IN REACH. WILL CONTINUE TO MONITOR.

## 2020-08-11 NOTE — NUR
PER ROSA ISELA GOULD, WAIT FOR HOME HEALTH TO BE SET UP BEFORE DISCHARGING PATIENT. WILL CARRY OUT 
ORDER. PATIENT MADE AWARE

## 2020-08-11 NOTE — NUR
PT'S VAGINAL BLEEDING STOPPED. RIVAS CATHETER DRAINING CLEAR URINE IN MODERATE AMOUNTS. AS 
PER DR. ERAZO, NO NEED FOR BLOOD WORK FOR TODAY.

## 2020-08-11 NOTE — NUR
RECEIVED REPORT FROM AM SHIFT, RN. PT AAO X4. PT BEDREST FOR NOW, BUT ABLE TO AMBULATE. ON 
ROOM AIR. WITH RIVAS CATHETER INSERTED , CLEAN DRY AND INTACT, PATENT. WITH 20G IV IN L AC, 
CLEAN DRY AND INTACT. WITH PICC LINE ON THE RIGHT UPPER ARM, DOUBLE LUMEN. NO ACUTE DISTRESS 
NOTED. SAFETY PRECAUTIONS IN PLACE. BED IN LOW POSITION, CALL LIGHT IN REACH. WILL CONTINUE 
TO MONITOR.

## 2020-08-11 NOTE — NUR
PATIENT'S PREPARED FOR DISCHARGE, PT. CHANGED HER CLOTHES. DOCUMENTS WITH HER. PT CALLING 
HIS  TO PICK HIM UP.

## 2020-08-12 NOTE — NUR
LATE ENTRY - END TIME FOR NS BOLUS 0320, START TIME WAS 0238

                    END TIME FOR CEFTAZIDIME 0304, START TIME WAS 0236

## 2020-08-31 ENCOUNTER — HOSPITAL ENCOUNTER (EMERGENCY)
Dept: HOSPITAL 26 - MED | Age: 40
Discharge: HOME | End: 2020-08-31
Payer: COMMERCIAL

## 2020-08-31 VITALS — DIASTOLIC BLOOD PRESSURE: 69 MMHG | SYSTOLIC BLOOD PRESSURE: 120 MMHG

## 2020-08-31 VITALS — BODY MASS INDEX: 40.67 KG/M2 | HEIGHT: 62 IN | WEIGHT: 221 LBS

## 2020-08-31 DIAGNOSIS — O26.892: Primary | ICD-10-CM

## 2020-08-31 DIAGNOSIS — E11.9: ICD-10-CM

## 2020-08-31 DIAGNOSIS — Z79.899: ICD-10-CM

## 2020-08-31 DIAGNOSIS — R33.8: ICD-10-CM

## 2020-08-31 LAB
APPEARANCE UR: CLEAR
BILIRUB UR QL STRIP: NEGATIVE
COLOR UR: YELLOW
GLUCOSE UR STRIP-MCNC: NEGATIVE MG/DL
HGB UR QL STRIP: NEGATIVE
LEUKOCYTE ESTERASE UR QL STRIP: (no result)
NITRITE UR QL STRIP: NEGATIVE
PH UR STRIP: 6 [PH] (ref 5–9)
RBC #/AREA URNS HPF: (no result) /HPF (ref 0–5)
WBC,URINE: (no result) /HPF (ref 0–5)

## 2020-10-06 ENCOUNTER — HOSPITAL ENCOUNTER (EMERGENCY)
Dept: HOSPITAL 26 - MED | Age: 40
Discharge: HOME | End: 2020-10-06
Payer: COMMERCIAL

## 2020-10-06 VITALS — DIASTOLIC BLOOD PRESSURE: 78 MMHG | SYSTOLIC BLOOD PRESSURE: 120 MMHG

## 2020-10-06 VITALS — DIASTOLIC BLOOD PRESSURE: 86 MMHG | SYSTOLIC BLOOD PRESSURE: 140 MMHG

## 2020-10-06 VITALS — BODY MASS INDEX: 41.22 KG/M2 | WEIGHT: 224 LBS | HEIGHT: 62 IN

## 2020-10-06 DIAGNOSIS — E11.9: ICD-10-CM

## 2020-10-06 DIAGNOSIS — Z79.899: ICD-10-CM

## 2020-10-06 DIAGNOSIS — R33.9: Primary | ICD-10-CM

## 2020-10-06 DIAGNOSIS — N93.9: ICD-10-CM

## 2020-10-06 LAB
ALBUMIN FLD-MCNC: 2.8 G/DL (ref 3.4–5)
ANION GAP SERPL CALCULATED.3IONS-SCNC: 14.4 MMOL/L (ref 8–16)
APPEARANCE UR: CLEAR
AST SERPL-CCNC: 33 U/L (ref 15–37)
BASOPHILS # BLD AUTO: 0.1 K/UL (ref 0–0.22)
BASOPHILS NFR BLD AUTO: 0.6 % (ref 0–2)
BILIRUB SERPL-MCNC: 0.2 MG/DL (ref 0–1)
BILIRUB UR QL STRIP: NEGATIVE
BUN SERPL-MCNC: 6 MG/DL (ref 7–18)
CHLORIDE SERPL-SCNC: 109 MMOL/L (ref 98–107)
CO2 SERPL-SCNC: 25.2 MMOL/L (ref 21–32)
COLOR UR: YELLOW
CREAT SERPL-MCNC: 0.7 MG/DL (ref 0.6–1.3)
EOSINOPHIL # BLD AUTO: 0.2 K/UL (ref 0–0.4)
EOSINOPHIL NFR BLD AUTO: 2 % (ref 0–4)
ERYTHROCYTE [DISTWIDTH] IN BLOOD BY AUTOMATED COUNT: 14.7 % (ref 11.6–13.7)
GFR SERPL CREATININE-BSD FRML MDRD: 118 ML/MIN (ref 90–?)
GLUCOSE SERPL-MCNC: 119 MG/DL (ref 74–106)
GLUCOSE UR STRIP-MCNC: NEGATIVE MG/DL
HCT VFR BLD AUTO: 31.8 % (ref 36–48)
HGB BLD-MCNC: 10.3 G/DL (ref 12–16)
HGB UR QL STRIP: NEGATIVE
LEUKOCYTE ESTERASE UR QL STRIP: NEGATIVE
LYMPHOCYTES # BLD AUTO: 3.9 K/UL (ref 2.5–16.5)
LYMPHOCYTES NFR BLD AUTO: 33.4 % (ref 20.5–51.1)
MCH RBC QN AUTO: 25 PG (ref 27–31)
MCHC RBC AUTO-ENTMCNC: 32 G/DL (ref 33–37)
MCV RBC AUTO: 75.4 FL (ref 80–94)
MONOCYTES # BLD AUTO: 0.8 K/UL (ref 0.8–1)
MONOCYTES NFR BLD AUTO: 7 % (ref 1.7–9.3)
NEUTROPHILS # BLD AUTO: 6.6 K/UL (ref 1.8–7.7)
NEUTROPHILS NFR BLD AUTO: 57 % (ref 42.2–75.2)
NITRITE UR QL STRIP: NEGATIVE
PH UR STRIP: 5.5 [PH] (ref 5–9)
PLATELET # BLD AUTO: 234 K/UL (ref 140–450)
POTASSIUM SERPL-SCNC: 3.6 MMOL/L (ref 3.5–5.1)
RBC # BLD AUTO: 4.21 MIL/UL (ref 4.2–5.4)
RBC #/AREA URNS HPF: (no result) /HPF (ref 0–5)
SODIUM SERPL-SCNC: 145 MMOL/L (ref 136–145)
WBC # BLD AUTO: 11.7 K/UL (ref 4.8–10.8)
WBC,URINE: (no result) /HPF (ref 0–5)

## 2020-10-06 PROCEDURE — 99284 EMERGENCY DEPT VISIT MOD MDM: CPT

## 2020-10-06 PROCEDURE — 81001 URINALYSIS AUTO W/SCOPE: CPT

## 2020-10-06 PROCEDURE — 76856 US EXAM PELVIC COMPLETE: CPT

## 2020-10-06 PROCEDURE — 85025 COMPLETE CBC W/AUTO DIFF WBC: CPT

## 2020-10-06 PROCEDURE — 87086 URINE CULTURE/COLONY COUNT: CPT

## 2020-10-06 PROCEDURE — 93976 VASCULAR STUDY: CPT

## 2020-10-06 PROCEDURE — 80053 COMPREHEN METABOLIC PANEL: CPT

## 2020-10-06 PROCEDURE — 51702 INSERT TEMP BLADDER CATH: CPT

## 2020-10-06 PROCEDURE — 36415 COLL VENOUS BLD VENIPUNCTURE: CPT

## 2020-10-06 NOTE — NUR
Lisa catheter inserted via sterile technique, 16 Turkmen, clear yellow urine 
flow received, pt tolerated well.

## 2020-10-06 NOTE — NUR
39 Y/O FEMALE PRESENTED TO ED C/O SUPRPUBIC ABD CRAMPING 5/10 PAIN X 3 HR. PT 
HAD A 20 WEEK BIRTH, A3. PT DURING PREGNANCY HAD INDWELLING CATH PLACED, 
D/C CATH X 2 WEEKS. PT CURRENTLY + URGENCY, DYSURIA , RETENTION. PT STATES IT 
FEELS LIKE A PRESSURE ON ABD. PT STATES PRESSURE WAS MINIMALLY RELIEVED AFTER 
SHE USED RESTROOM AND PASSED A LARGE CLOT W/ SMALL AMOUNT OF URINE. ABD ROUND, 
SOFT AND NON TENDER. PT STATES ABD CRAMPING HAS BEEN OCCURING SINCE DELIVERY. 
PT RESTING IN BED, LOCKED AND IN LOWEST POSITION, HOB ELEVATED, SIDE RAIL X1. 
PT GIVEN URINE CUP FOR ENCOURAGEMENT FOR URINE SAMPLE. VSS. NO ACUTE DISTRESS 
NOTED AT THIS TIME. 



PMH: GESTATIONAL DM 

NKA

## 2020-12-04 ENCOUNTER — HOSPITAL ENCOUNTER (OUTPATIENT)
Dept: HOSPITAL 26 - MOR | Age: 40
Discharge: HOME | End: 2020-12-04
Attending: OBSTETRICS & GYNECOLOGY
Payer: COMMERCIAL

## 2020-12-04 VITALS — HEIGHT: 62 IN | BODY MASS INDEX: 40.48 KG/M2 | WEIGHT: 220 LBS

## 2020-12-04 DIAGNOSIS — Z30.2: Primary | ICD-10-CM

## 2020-12-04 DIAGNOSIS — Z20.828: ICD-10-CM

## 2020-12-04 DIAGNOSIS — Z79.899: ICD-10-CM

## 2020-12-04 PROCEDURE — 81025 URINE PREGNANCY TEST: CPT

## 2020-12-04 PROCEDURE — 86901 BLOOD TYPING SEROLOGIC RH(D): CPT

## 2020-12-04 PROCEDURE — 36415 COLL VENOUS BLD VENIPUNCTURE: CPT

## 2020-12-04 PROCEDURE — U0003 INFECTIOUS AGENT DETECTION BY NUCLEIC ACID (DNA OR RNA); SEVERE ACUTE RESPIRATORY SYNDROME CORONAVIRUS 2 (SARS-COV-2) (CORONAVIRUS DISEASE [COVID-19]), AMPLIFIED PROBE TECHNIQUE, MAKING USE OF HIGH THROUGHPUT TECHNOLOGIES AS DESCRIBED BY CMS-2020-01-R: HCPCS

## 2020-12-04 PROCEDURE — 86886 COOMBS TEST INDIRECT TITER: CPT

## 2020-12-04 PROCEDURE — 86900 BLOOD TYPING SEROLOGIC ABO: CPT

## 2020-12-04 PROCEDURE — 58661 LAPAROSCOPY REMOVE ADNEXA: CPT

## 2020-12-04 RX ADMIN — HYDROMORPHONE HYDROCHLORIDE PRN MG: 1 INJECTION, SOLUTION INTRAMUSCULAR; INTRAVENOUS; SUBCUTANEOUS at 13:27

## 2020-12-04 RX ADMIN — HYDROMORPHONE HYDROCHLORIDE PRN MG: 1 INJECTION, SOLUTION INTRAMUSCULAR; INTRAVENOUS; SUBCUTANEOUS at 13:17

## 2020-12-04 RX ADMIN — HYDROMORPHONE HYDROCHLORIDE PRN MG: 1 INJECTION, SOLUTION INTRAMUSCULAR; INTRAVENOUS; SUBCUTANEOUS at 13:38

## 2021-08-02 ENCOUNTER — HOSPITAL ENCOUNTER (EMERGENCY)
Dept: HOSPITAL 26 - MED | Age: 41
LOS: 1 days | Discharge: LEFT BEFORE DISCHARGE | End: 2021-08-03
Payer: COMMERCIAL

## 2021-08-02 VITALS — BODY MASS INDEX: 40.48 KG/M2 | HEIGHT: 62 IN | WEIGHT: 220 LBS

## 2021-08-02 VITALS — DIASTOLIC BLOOD PRESSURE: 85 MMHG | SYSTOLIC BLOOD PRESSURE: 117 MMHG

## 2021-08-02 DIAGNOSIS — B34.9: Primary | ICD-10-CM

## 2021-08-02 DIAGNOSIS — R51.9: ICD-10-CM

## 2021-08-02 DIAGNOSIS — R11.2: ICD-10-CM

## 2021-08-02 PROCEDURE — 99283 EMERGENCY DEPT VISIT LOW MDM: CPT

## 2021-08-03 NOTE — NUR
ATTEMPTED TO COLLECT PCR, RAPID COVID, STREP SWABS. PT NOT FOUND IN TENT, CAR, 
OR LOBBY. DR. LOPEZ NOTIFIED.

## 2021-08-03 NOTE — NUR
ATTEMPTED 2ND CALL TO SWAB PATIENT WITH NO ANSWER. PATIENT ELOPED FROM 
FACILITY. DISCHARGE INSTRUCTIONS NOT GIVEN TO PATIENT. DR. LOPEZ NOTIFIED.